# Patient Record
Sex: MALE | Race: WHITE | NOT HISPANIC OR LATINO | Employment: OTHER | URBAN - METROPOLITAN AREA
[De-identification: names, ages, dates, MRNs, and addresses within clinical notes are randomized per-mention and may not be internally consistent; named-entity substitution may affect disease eponyms.]

---

## 2022-05-31 ENCOUNTER — HOSPITAL ENCOUNTER (OUTPATIENT)
Facility: HOSPITAL | Age: 72
Discharge: HOME | End: 2022-05-31
Attending: INTERNAL MEDICINE | Admitting: INTERNAL MEDICINE
Payer: MEDICARE

## 2022-05-31 ENCOUNTER — ANESTHESIA (OUTPATIENT)
Dept: ENDOSCOPY | Facility: HOSPITAL | Age: 72
End: 2022-05-31
Payer: MEDICARE

## 2022-05-31 ENCOUNTER — ANESTHESIA EVENT (OUTPATIENT)
Dept: ENDOSCOPY | Facility: HOSPITAL | Age: 72
End: 2022-05-31
Payer: MEDICARE

## 2022-05-31 VITALS
WEIGHT: 210 LBS | BODY MASS INDEX: 31.83 KG/M2 | OXYGEN SATURATION: 95 % | SYSTOLIC BLOOD PRESSURE: 140 MMHG | DIASTOLIC BLOOD PRESSURE: 69 MMHG | TEMPERATURE: 98 F | HEIGHT: 68 IN | HEART RATE: 66 BPM | RESPIRATION RATE: 21 BRPM

## 2022-05-31 PROCEDURE — 45378 DIAGNOSTIC COLONOSCOPY: CPT | Performed by: INTERNAL MEDICINE

## 2022-05-31 PROCEDURE — 63600175 PHARM REV CODE 636 W HCPCS: Performed by: NURSE ANESTHETIST, CERTIFIED REGISTERED

## 2022-05-31 PROCEDURE — 37000008 HC ANESTHESIA 1ST 15 MINUTES: Performed by: INTERNAL MEDICINE

## 2022-05-31 PROCEDURE — 25000003 PHARM REV CODE 250: Performed by: NURSE ANESTHETIST, CERTIFIED REGISTERED

## 2022-05-31 PROCEDURE — 25000003 PHARM REV CODE 250: Performed by: INTERNAL MEDICINE

## 2022-05-31 PROCEDURE — 37000009 HC ANESTHESIA EA ADD 15 MINS: Performed by: INTERNAL MEDICINE

## 2022-05-31 RX ORDER — SODIUM CHLORIDE, SODIUM GLUCONATE, SODIUM ACETATE, POTASSIUM CHLORIDE AND MAGNESIUM CHLORIDE 30; 37; 368; 526; 502 MG/100ML; MG/100ML; MG/100ML; MG/100ML; MG/100ML
1000 INJECTION, SOLUTION INTRAVENOUS CONTINUOUS
Status: DISCONTINUED | OUTPATIENT
Start: 2022-05-31 | End: 2022-05-31 | Stop reason: HOSPADM

## 2022-05-31 RX ORDER — PROPOFOL 10 MG/ML
VIAL (ML) INTRAVENOUS
Status: DISCONTINUED
Start: 2022-05-31 | End: 2022-05-31 | Stop reason: HOSPADM

## 2022-05-31 RX ORDER — LIDOCAINE HYDROCHLORIDE 10 MG/ML
1 INJECTION, SOLUTION EPIDURAL; INFILTRATION; INTRACAUDAL; PERINEURAL ONCE
Status: CANCELLED | OUTPATIENT
Start: 2022-05-31 | End: 2022-05-31

## 2022-05-31 RX ORDER — LIDOCAINE HCL/PF 100 MG/5ML
SYRINGE (ML) INTRAVENOUS
Status: DISCONTINUED | OUTPATIENT
Start: 2022-05-31 | End: 2022-05-31

## 2022-05-31 RX ORDER — SODIUM CHLORIDE, SODIUM GLUCONATE, SODIUM ACETATE, POTASSIUM CHLORIDE AND MAGNESIUM CHLORIDE 30; 37; 368; 526; 502 MG/100ML; MG/100ML; MG/100ML; MG/100ML; MG/100ML
1000 INJECTION, SOLUTION INTRAVENOUS CONTINUOUS
Status: CANCELLED | OUTPATIENT
Start: 2022-05-31 | End: 2022-06-30

## 2022-05-31 RX ORDER — PROPOFOL 10 MG/ML
INJECTION, EMULSION INTRAVENOUS CONTINUOUS PRN
Status: DISCONTINUED | OUTPATIENT
Start: 2022-05-31 | End: 2022-05-31

## 2022-05-31 RX ADMIN — PROPOFOL 175 MCG/KG/MIN: 10 INJECTION, EMULSION INTRAVENOUS at 07:05

## 2022-05-31 RX ADMIN — SODIUM CHLORIDE, SODIUM GLUCONATE, SODIUM ACETATE, POTASSIUM CHLORIDE AND MAGNESIUM CHLORIDE 1000 ML: 526; 502; 368; 37; 30 INJECTION, SOLUTION INTRAVENOUS at 07:05

## 2022-05-31 RX ADMIN — LIDOCAINE HYDROCHLORIDE 40 MG: 20 INJECTION, SOLUTION INTRAVENOUS at 07:05

## 2022-05-31 NOTE — PROVATION PATIENT INSTRUCTIONS
Discharge Summary/Instructions after an Endoscopic Procedure  Patient Name: Jose Keita  Patient MRN: [id number]  Patient YOB: 1950  Tuesday, May 31, 2022  Nessa German III, MD  Dear patient,  As a result of recent federal legislation (The Federal Cures Act), you may   receive lab or pathology results from your procedure in your [facility]   account before your physician is able to contact you. Your physician or   their representative will relay the results to you with their   recommendations at their soonest availability.  Thank you,  RESTRICTIONS:  During your procedure today, you received medications for sedation.  These   medications may affect your judgment, balance and coordination.  Therefore,   for 24 hours, you have the following restrictions:   - DO NOT drive a car, operate machinery, make legal/financial decisions,   sign important papers or drink alcohol.    ACTIVITY:  Today: no heavy lifting, straining or running due to procedural   sedation/anesthesia.  The following day: return to full activity including work.  DIET:  Eat and drink normally unless instructed otherwise.     TREATMENT FOR COMMON SIDE EFFECTS:  - Mild abdominal pain, nausea, belching, bloating or excessive gas:  rest,   eat lightly and use a heating pad.  - Sore Throat: treat with throat lozenges and/or gargle with warm salt   water.  - Because air was used during the procedure, expelling large amounts of air   from your rectum or belching is normal.  - If a bowel prep was taken, you may not have a bowel movement for 1-3 days.    This is normal.  SYMPTOMS TO WATCH FOR AND REPORT TO YOUR PHYSICIAN:  1. Abdominal pain or bloating, other than gas cramps.  2. Chest pain.  3. Back pain.  4. Signs of infection such as: chills or fever occurring within 24 hours   after the procedure.  5. Rectal bleeding, which would show as bright red, maroon, or black stools.   (A tablespoon of blood from the rectum is not serious, especially  if   hemorrhoids are present.)  6. Vomiting.  7. Weakness or dizziness.  GO DIRECTLY TO THE NEAREST EMERGENCY ROOM IF YOU HAVE ANY OF THE FOLLOWING:      Difficulty breathing              Chills and/or fever over 101 F   Persistent vomiting and/or vomiting blood   Severe abdominal pain   Severe chest pain   Black, tarry stools   Bleeding- more than one tablespoon   Any other symptom or condition that you feel may need urgent attention  Your doctor recommends these additional instructions:  If any biopsies were taken, your doctors clinic will contact you in 1 to 2   weeks with any results.  - Repeat colonoscopy in 5 years for screening purposes.  For questions, problems or results please call your physician - Nessa German III, MD at Work:  [phone].  [facility], EMERGENCY ROOM PHONE NUMBER: [phone]  IF A COMPLICATION OR EMERGENCY SITUATION ARISES AND YOU ARE UNABLE TO REACH   YOUR PHYSICIAN - GO DIRECTLY TO THE EMERGENCY ROOM.  Nessa German III, MD  5/31/2022 8:02:06 AM  This report has been verified and signed electronically.  Dear patient,  As a result of recent federal legislation (The Federal Cures Act), you may   receive lab or pathology results from your procedure in your [facility]   account before your physician is able to contact you. Your physician or   their representative will relay the results to you with their   recommendations at their soonest availability.  Thank you,  PROVATION

## 2022-05-31 NOTE — ANESTHESIA PREPROCEDURE EVALUATION
05/31/2022  Jose Keita is a 72 y.o., male.      Pre-op Assessment    I have reviewed the Patient Summary Reports.     I have reviewed the Nursing Notes. I have reviewed the NPO Status.   I have reviewed the Medications.     Review of Systems      Physical Exam    Airway:  Mallampati: II   Mouth Opening: Normal  TM Distance: Normal  Tongue: Normal  Neck ROM: Normal ROM    Chest/Lungs:  Clear to auscultation    Heart:  Rate: Normal        Anesthesia Plan  Type of Anesthesia, risks & benefits discussed:    Anesthesia Type: Gen Natural Airway  Intra-op Monitoring Plan: Standard ASA Monitors  Induction:  IV  Informed Consent: Informed consent signed with the Patient and all parties understand the risks and agree with anesthesia plan.  All questions answered.   ASA Score: 2  Day of Surgery Review of History & Physical: H&P Update referred to the surgeon/provider.    Ready For Surgery From Anesthesia Perspective.     .

## 2022-05-31 NOTE — ANESTHESIA POSTPROCEDURE EVALUATION
Anesthesia Post Evaluation    Patient: Jose Keita    Procedure(s) Performed: Procedure(s) (LRB):  COLON (N/A)          Patient location during evaluation: PACU  Post-procedure mental status: @ basline.  Post-procedure vital signs: reviewed and stable  Pain management: adequate      Anesthetic complications: no      Cardiovascular status: blood pressure returned to baseline  Respiratory status: @ baseline.  Hydration status: euvolemic            Vitals Value Taken Time   /69 05/31/22 0824   Temp 98 05/31/22 0934   Pulse 66 05/31/22 0824   Resp 21 05/31/22 0824   SpO2 95 % 05/31/22 0824         No case tracking events are documented in the log.      Pain/Will Score: Will Score: 10 (5/31/2022  8:25 AM)

## 2022-05-31 NOTE — DISCHARGE SUMMARY
[facility] Endoscopy  Discharge Note  Short Stay    Procedure(s) (LRB):  COLON (N/A)    OUTCOME: Patient tolerated treatment/procedure well without complication and is now ready for discharge.    DISPOSITION: Home or Self Care    FINAL DIAGNOSIS:  <principal problem not specified>    FOLLOWUP: With primary care provider    DISCHARGE INSTRUCTIONS:  No discharge procedures on file.     TIME SPENT ON DISCHARGE: 15 minutes

## 2022-05-31 NOTE — H&P
.jn  Endoscopy History and Physical    PCP - Andrea Peterson MD    Procedure - Colonoscopy  Sedation: MAC  ASA - per anesthesia  Mallampati - per anesthesia  History of Anesthesia problems - no  Family history Anesthesia problems -  no     HPI:  This is a 72 y.o. male here for evaluation of colonoscopy for family hx of colon ca     Reflux - no  Dysphagia - no  Abdominal pain - no  Diarrhea - no    ROS:  Constitutional: No fevers, chills, No weight loss  ENT: No allergies  CV: No chest pain  Pulm: No cough, No shortness of breath  Ophtho: No vision changes  GI: see HPI  Medical History:  has a past medical history of Cardiomyopathy, Chronic anticoagulation, Coronary artery disease, Decreased cardiac ejection fraction, Hypertension, Mixed hyperlipidemia, Paroxysmal atrial fibrillation, and Sleep apnea.    Surgical History:  has a past surgical history that includes Tonsillectomy; Splenectomy; and Carotid endarterectomy.    Family History: family history includes Colon cancer in his brother.. Otherwise no colon cancer, inflammatory bowel disease, or GI malignancies.    Social History:  reports that he has never smoked. He has never used smokeless tobacco.    Review of patient's allergies indicates:  No Known Allergies    Medications:   No medications prior to admission.       Objective Findings:    Vital Signs: Per nursing notes.    Physical Exam:  General Appearance: Well appearing in no acute distress  Head:   Normocephalic, without obvious abnormality  Eyes:    No scleral icterus  Airway: Open  Neck: No restriction in mobility  Lungs: CTA bilaterally in anterior and posterior fields, no wheezes, no crackles.  Heart:  Regular rate and rhythm, S1, S2 normal, no murmurs heard  Abdomen: Soft, non tender, non distended      Labs:  Lab Results   Component Value Date    WBC 7.4 05/24/2021    HGB 11.4 (L) 05/24/2021    HCT 40.2 (L) 05/24/2021     05/24/2021    ALT 48 05/24/2021    AST 51 (H) 05/24/2021      05/25/2021    K 4.4 05/25/2021    CREATININE 1.35 (H) 05/25/2021    BUN 26.7 (H) 05/25/2021    CO2 29 05/25/2021    INR 2.4 (H) 05/23/2021         I have explained the risks and benefits of endoscopy procedures to the patient including but not limited to bleeding, perforation, infection, and death.    Thank you so much for allowing me to participate in the care of Jose German Iii, MD

## 2023-07-06 ENCOUNTER — HOSPITAL ENCOUNTER (INPATIENT)
Facility: HOSPITAL | Age: 73
LOS: 1 days | Discharge: HOME | DRG: 313 | End: 2023-07-08
Attending: STUDENT IN AN ORGANIZED HEALTH CARE EDUCATION/TRAINING PROGRAM | Admitting: INTERNAL MEDICINE
Payer: MEDICARE

## 2023-07-06 DIAGNOSIS — R07.9 CHEST PAIN: ICD-10-CM

## 2023-07-06 DIAGNOSIS — I50.9 CHF (CONGESTIVE HEART FAILURE): ICD-10-CM

## 2023-07-06 DIAGNOSIS — R51.9 ACUTE INTRACTABLE HEADACHE, UNSPECIFIED HEADACHE TYPE: Primary | ICD-10-CM

## 2023-07-06 LAB
BASOPHILS # BLD AUTO: 0.07 X10(3)/MCL
BASOPHILS NFR BLD AUTO: 1 %
EOSINOPHIL # BLD AUTO: 0.14 X10(3)/MCL (ref 0–0.9)
EOSINOPHIL NFR BLD AUTO: 2 %
ERYTHROCYTE [DISTWIDTH] IN BLOOD BY AUTOMATED COUNT: 15.2 % (ref 11.5–17)
HCT VFR BLD AUTO: 44.4 % (ref 42–52)
HGB BLD-MCNC: 14.7 G/DL (ref 14–18)
IMM GRANULOCYTES # BLD AUTO: 0.03 X10(3)/MCL (ref 0–0.04)
IMM GRANULOCYTES NFR BLD AUTO: 0.4 %
LYMPHOCYTES # BLD AUTO: 1.37 X10(3)/MCL (ref 0.6–4.6)
LYMPHOCYTES NFR BLD AUTO: 19.7 %
MCH RBC QN AUTO: 31.3 PG (ref 27–31)
MCHC RBC AUTO-ENTMCNC: 33.1 G/DL (ref 33–36)
MCV RBC AUTO: 94.7 FL (ref 80–94)
MONOCYTES # BLD AUTO: 1.3 X10(3)/MCL (ref 0.1–1.3)
MONOCYTES NFR BLD AUTO: 18.7 %
NEUTROPHILS # BLD AUTO: 4.05 X10(3)/MCL (ref 2.1–9.2)
NEUTROPHILS NFR BLD AUTO: 58.2 %
NRBC BLD AUTO-RTO: 0 %
PLATELET # BLD AUTO: 257 X10(3)/MCL (ref 130–400)
PMV BLD AUTO: 10.4 FL (ref 7.4–10.4)
RBC # BLD AUTO: 4.69 X10(6)/MCL (ref 4.7–6.1)
WBC # SPEC AUTO: 6.96 X10(3)/MCL (ref 4.5–11.5)

## 2023-07-06 PROCEDURE — 93010 ELECTROCARDIOGRAM REPORT: CPT | Mod: ,,, | Performed by: INTERNAL MEDICINE

## 2023-07-06 PROCEDURE — 83880 ASSAY OF NATRIURETIC PEPTIDE: CPT | Performed by: STUDENT IN AN ORGANIZED HEALTH CARE EDUCATION/TRAINING PROGRAM

## 2023-07-06 PROCEDURE — 93010 EKG 12-LEAD: ICD-10-PCS | Mod: ,,, | Performed by: INTERNAL MEDICINE

## 2023-07-06 PROCEDURE — 80053 COMPREHEN METABOLIC PANEL: CPT | Performed by: STUDENT IN AN ORGANIZED HEALTH CARE EDUCATION/TRAINING PROGRAM

## 2023-07-06 PROCEDURE — 93005 ELECTROCARDIOGRAM TRACING: CPT

## 2023-07-06 PROCEDURE — 84484 ASSAY OF TROPONIN QUANT: CPT | Performed by: STUDENT IN AN ORGANIZED HEALTH CARE EDUCATION/TRAINING PROGRAM

## 2023-07-06 PROCEDURE — 83735 ASSAY OF MAGNESIUM: CPT | Performed by: STUDENT IN AN ORGANIZED HEALTH CARE EDUCATION/TRAINING PROGRAM

## 2023-07-06 PROCEDURE — 85025 COMPLETE CBC W/AUTO DIFF WBC: CPT | Performed by: STUDENT IN AN ORGANIZED HEALTH CARE EDUCATION/TRAINING PROGRAM

## 2023-07-07 LAB
ALBUMIN SERPL-MCNC: 3.9 G/DL (ref 3.4–4.8)
ALBUMIN SERPL-MCNC: 3.9 G/DL (ref 3.4–4.8)
ALBUMIN/GLOB SERPL: 1.4 RATIO (ref 1.1–2)
ALBUMIN/GLOB SERPL: 1.4 RATIO (ref 1.1–2)
ALP SERPL-CCNC: 72 UNIT/L (ref 40–150)
ALP SERPL-CCNC: 77 UNIT/L (ref 40–150)
ALT SERPL-CCNC: 22 UNIT/L (ref 0–55)
ALT SERPL-CCNC: 23 UNIT/L (ref 0–55)
APPEARANCE UR: CLEAR
AST SERPL-CCNC: 29 UNIT/L (ref 5–34)
AST SERPL-CCNC: 29 UNIT/L (ref 5–34)
AV INDEX (PROSTH): 0.43
AV MEAN GRADIENT: 5 MMHG
AV PEAK GRADIENT: 9 MMHG
AV VALVE AREA: 1.63 CM2
AV VELOCITY RATIO: 0.39
BACTERIA #/AREA URNS AUTO: NORMAL /HPF
BASOPHILS # BLD AUTO: 0.08 X10(3)/MCL
BASOPHILS NFR BLD AUTO: 1.3 %
BILIRUB UR QL STRIP.AUTO: NEGATIVE MG/DL
BILIRUBIN DIRECT+TOT PNL SERPL-MCNC: 0.5 MG/DL
BILIRUBIN DIRECT+TOT PNL SERPL-MCNC: 0.7 MG/DL
BNP BLD-MCNC: 294.5 PG/ML
BSA FOR ECHO PROCEDURE: 2.23 M2
BUN SERPL-MCNC: 12.5 MG/DL (ref 8.4–25.7)
BUN SERPL-MCNC: 14.7 MG/DL (ref 8.4–25.7)
CALCIUM SERPL-MCNC: 8.8 MG/DL (ref 8.8–10)
CALCIUM SERPL-MCNC: 8.9 MG/DL (ref 8.8–10)
CHLORIDE SERPL-SCNC: 104 MMOL/L (ref 98–107)
CHLORIDE SERPL-SCNC: 105 MMOL/L (ref 98–107)
CO2 SERPL-SCNC: 22 MMOL/L (ref 23–31)
CO2 SERPL-SCNC: 24 MMOL/L (ref 23–31)
COLOR UR: YELLOW
CREAT SERPL-MCNC: 1.02 MG/DL (ref 0.73–1.18)
CREAT SERPL-MCNC: 1.14 MG/DL (ref 0.73–1.18)
CV ECHO LV RWT: 0.45 CM
DOP CALC AO PEAK VEL: 1.54 M/S
DOP CALC AO VTI: 32.8 CM
DOP CALC LVOT AREA: 3.8 CM2
DOP CALC LVOT DIAMETER: 2.2 CM
DOP CALC LVOT PEAK VEL: 0.6 M/S
DOP CALC LVOT STROKE VOLUME: 53.57 CM3
DOP CALCLVOT PEAK VEL VTI: 14.1 CM
E WAVE DECELERATION TIME: 203 MSEC
E/A RATIO: 1.86
E/E' RATIO: 7.26 M/S
ECHO LV POSTERIOR WALL: 1.18 CM (ref 0.6–1.1)
EJECTION FRACTION: 45 %
EOSINOPHIL # BLD AUTO: 0.1 X10(3)/MCL (ref 0–0.9)
EOSINOPHIL NFR BLD AUTO: 1.7 %
ERYTHROCYTE [DISTWIDTH] IN BLOOD BY AUTOMATED COUNT: 15.3 % (ref 11.5–17)
FRACTIONAL SHORTENING: 29 % (ref 28–44)
GFR SERPLBLD CREATININE-BSD FMLA CKD-EPI: >60 MLS/MIN/1.73/M2
GFR SERPLBLD CREATININE-BSD FMLA CKD-EPI: >60 MLS/MIN/1.73/M2
GLOBULIN SER-MCNC: 2.8 GM/DL (ref 2.4–3.5)
GLOBULIN SER-MCNC: 2.8 GM/DL (ref 2.4–3.5)
GLUCOSE SERPL-MCNC: 110 MG/DL (ref 82–115)
GLUCOSE SERPL-MCNC: 98 MG/DL (ref 82–115)
GLUCOSE UR QL STRIP.AUTO: NEGATIVE MG/DL
HCT VFR BLD AUTO: 44.1 % (ref 42–52)
HGB BLD-MCNC: 14.4 G/DL (ref 14–18)
IMM GRANULOCYTES # BLD AUTO: 0.03 X10(3)/MCL (ref 0–0.04)
IMM GRANULOCYTES NFR BLD AUTO: 0.5 %
INTERVENTRICULAR SEPTUM: 1.42 CM (ref 0.6–1.1)
KETONES UR QL STRIP.AUTO: NEGATIVE MG/DL
LEFT ATRIUM SIZE: 4.4 CM
LEFT ATRIUM VOLUME INDEX MOD: 58.4 ML/M2
LEFT ATRIUM VOLUME MOD: 125 CM3
LEFT INTERNAL DIMENSION IN SYSTOLE: 3.67 CM (ref 2.1–4)
LEFT VENTRICLE DIASTOLIC VOLUME INDEX: 60.28 ML/M2
LEFT VENTRICLE DIASTOLIC VOLUME: 129 ML
LEFT VENTRICLE MASS INDEX: 130 G/M2
LEFT VENTRICLE SYSTOLIC VOLUME INDEX: 26.6 ML/M2
LEFT VENTRICLE SYSTOLIC VOLUME: 57 ML
LEFT VENTRICULAR INTERNAL DIMENSION IN DIASTOLE: 5.19 CM (ref 3.5–6)
LEFT VENTRICULAR MASS: 277.6 G
LEUKOCYTE ESTERASE UR QL STRIP.AUTO: NEGATIVE UNIT/L
LV LATERAL E/E' RATIO: 5.75 M/S
LV SEPTAL E/E' RATIO: 9.86 M/S
LVOT MG: 1 MMHG
LVOT MV: 0.39 CM/S
LYMPHOCYTES # BLD AUTO: 1.49 X10(3)/MCL (ref 0.6–4.6)
LYMPHOCYTES NFR BLD AUTO: 24.8 %
MAGNESIUM SERPL-MCNC: 1.7 MG/DL (ref 1.6–2.6)
MAGNESIUM SERPL-MCNC: 1.7 MG/DL (ref 1.6–2.6)
MCH RBC QN AUTO: 30.9 PG (ref 27–31)
MCHC RBC AUTO-ENTMCNC: 32.7 G/DL (ref 33–36)
MCV RBC AUTO: 94.6 FL (ref 80–94)
MONOCYTES # BLD AUTO: 1.18 X10(3)/MCL (ref 0.1–1.3)
MONOCYTES NFR BLD AUTO: 19.7 %
MV PEAK A VEL: 0.37 M/S
MV PEAK E VEL: 0.69 M/S
NEUTROPHILS # BLD AUTO: 3.12 X10(3)/MCL (ref 2.1–9.2)
NEUTROPHILS NFR BLD AUTO: 52 %
NITRITE UR QL STRIP.AUTO: NEGATIVE
NRBC BLD AUTO-RTO: 0 %
OHS LV EJECTION FRACTION SIMPSONS BIPLANE MOD: 4 %
PH UR STRIP.AUTO: 6 [PH]
PHOSPHATE SERPL-MCNC: 3.2 MG/DL (ref 2.3–4.7)
PLATELET # BLD AUTO: 237 X10(3)/MCL (ref 130–400)
PMV BLD AUTO: 10.8 FL (ref 7.4–10.4)
POTASSIUM SERPL-SCNC: 4.1 MMOL/L (ref 3.5–5.1)
POTASSIUM SERPL-SCNC: 4.2 MMOL/L (ref 3.5–5.1)
PROT SERPL-MCNC: 6.7 GM/DL (ref 5.8–7.6)
PROT SERPL-MCNC: 6.7 GM/DL (ref 5.8–7.6)
PROT UR QL STRIP.AUTO: NEGATIVE MG/DL
PV PEAK VELOCITY: 1.57 CM/S
RBC # BLD AUTO: 4.66 X10(6)/MCL (ref 4.7–6.1)
RBC #/AREA URNS AUTO: <5 /HPF
RBC UR QL AUTO: NEGATIVE UNIT/L
RIGHT VENTRICULAR END-DIASTOLIC DIMENSION: 3.93 CM
SODIUM SERPL-SCNC: 139 MMOL/L (ref 136–145)
SODIUM SERPL-SCNC: 140 MMOL/L (ref 136–145)
SP GR UR STRIP.AUTO: 1.01 (ref 1–1.03)
SQUAMOUS #/AREA URNS AUTO: <5 /HPF
TDI LATERAL: 0.12 M/S
TDI SEPTAL: 0.07 M/S
TDI: 0.1 M/S
TRICUSPID ANNULAR PLANE SYSTOLIC EXCURSION: 2.14 CM
TROPONIN I SERPL-MCNC: <0.01 NG/ML (ref 0–0.04)
UROBILINOGEN UR STRIP-ACNC: 0.2 MG/DL
WBC # SPEC AUTO: 6 X10(3)/MCL (ref 4.5–11.5)
WBC #/AREA URNS AUTO: <5 /HPF

## 2023-07-07 PROCEDURE — 25000003 PHARM REV CODE 250: Performed by: NURSE PRACTITIONER

## 2023-07-07 PROCEDURE — 85025 COMPLETE CBC W/AUTO DIFF WBC: CPT | Performed by: NURSE PRACTITIONER

## 2023-07-07 PROCEDURE — 25000242 PHARM REV CODE 250 ALT 637 W/ HCPCS: Performed by: STUDENT IN AN ORGANIZED HEALTH CARE EDUCATION/TRAINING PROGRAM

## 2023-07-07 PROCEDURE — 84484 ASSAY OF TROPONIN QUANT: CPT | Performed by: STUDENT IN AN ORGANIZED HEALTH CARE EDUCATION/TRAINING PROGRAM

## 2023-07-07 PROCEDURE — 25000003 PHARM REV CODE 250: Performed by: STUDENT IN AN ORGANIZED HEALTH CARE EDUCATION/TRAINING PROGRAM

## 2023-07-07 PROCEDURE — 81001 URINALYSIS AUTO W/SCOPE: CPT | Performed by: STUDENT IN AN ORGANIZED HEALTH CARE EDUCATION/TRAINING PROGRAM

## 2023-07-07 PROCEDURE — 11000001 HC ACUTE MED/SURG PRIVATE ROOM

## 2023-07-07 PROCEDURE — 99900031 HC PATIENT EDUCATION (STAT)

## 2023-07-07 PROCEDURE — 99900035 HC TECH TIME PER 15 MIN (STAT)

## 2023-07-07 PROCEDURE — 21400001 HC TELEMETRY ROOM

## 2023-07-07 PROCEDURE — 84100 ASSAY OF PHOSPHORUS: CPT | Performed by: NURSE PRACTITIONER

## 2023-07-07 PROCEDURE — 25500020 PHARM REV CODE 255: Performed by: STUDENT IN AN ORGANIZED HEALTH CARE EDUCATION/TRAINING PROGRAM

## 2023-07-07 PROCEDURE — 99285 EMERGENCY DEPT VISIT HI MDM: CPT | Mod: 25

## 2023-07-07 PROCEDURE — 63600175 PHARM REV CODE 636 W HCPCS: Performed by: NURSE PRACTITIONER

## 2023-07-07 PROCEDURE — 25000003 PHARM REV CODE 250: Performed by: PHYSICIAN ASSISTANT

## 2023-07-07 PROCEDURE — 83735 ASSAY OF MAGNESIUM: CPT | Performed by: NURSE PRACTITIONER

## 2023-07-07 PROCEDURE — 63600175 PHARM REV CODE 636 W HCPCS: Performed by: STUDENT IN AN ORGANIZED HEALTH CARE EDUCATION/TRAINING PROGRAM

## 2023-07-07 PROCEDURE — 84484 ASSAY OF TROPONIN QUANT: CPT | Performed by: NURSE PRACTITIONER

## 2023-07-07 PROCEDURE — 27000221 HC OXYGEN, UP TO 24 HOURS

## 2023-07-07 PROCEDURE — 94660 CPAP INITIATION&MGMT: CPT

## 2023-07-07 PROCEDURE — 96375 TX/PRO/DX INJ NEW DRUG ADDON: CPT

## 2023-07-07 PROCEDURE — 27000190 HC CPAP FULL FACE MASK W/VALVE

## 2023-07-07 PROCEDURE — 94761 N-INVAS EAR/PLS OXIMETRY MLT: CPT

## 2023-07-07 PROCEDURE — 96374 THER/PROPH/DIAG INJ IV PUSH: CPT

## 2023-07-07 PROCEDURE — 80053 COMPREHEN METABOLIC PANEL: CPT | Performed by: NURSE PRACTITIONER

## 2023-07-07 RX ORDER — ONDANSETRON 2 MG/ML
4 INJECTION INTRAMUSCULAR; INTRAVENOUS EVERY 4 HOURS PRN
Status: DISCONTINUED | OUTPATIENT
Start: 2023-07-07 | End: 2023-07-08 | Stop reason: HOSPADM

## 2023-07-07 RX ORDER — FLUTICASONE FUROATE AND VILANTEROL 100; 25 UG/1; UG/1
1 POWDER RESPIRATORY (INHALATION) DAILY
Status: DISCONTINUED | OUTPATIENT
Start: 2023-07-07 | End: 2023-07-08 | Stop reason: HOSPADM

## 2023-07-07 RX ORDER — METOPROLOL SUCCINATE 25 MG/1
25 TABLET, EXTENDED RELEASE ORAL DAILY
Status: DISCONTINUED | OUTPATIENT
Start: 2023-07-07 | End: 2023-07-08 | Stop reason: HOSPADM

## 2023-07-07 RX ORDER — NALOXONE HCL 0.4 MG/ML
0.02 VIAL (ML) INJECTION
Status: DISCONTINUED | OUTPATIENT
Start: 2023-07-07 | End: 2023-07-08 | Stop reason: HOSPADM

## 2023-07-07 RX ORDER — ASPIRIN 325 MG
325 TABLET, DELAYED RELEASE (ENTERIC COATED) ORAL
Status: COMPLETED | OUTPATIENT
Start: 2023-07-07 | End: 2023-07-07

## 2023-07-07 RX ORDER — ALLOPURINOL 100 MG/1
100 TABLET ORAL DAILY
Status: DISCONTINUED | OUTPATIENT
Start: 2023-07-07 | End: 2023-07-08 | Stop reason: HOSPADM

## 2023-07-07 RX ORDER — TIZANIDINE 4 MG/1
4 TABLET ORAL 3 TIMES DAILY PRN
Status: DISCONTINUED | OUTPATIENT
Start: 2023-07-07 | End: 2023-07-08 | Stop reason: HOSPADM

## 2023-07-07 RX ORDER — COLCHICINE 0.6 MG/1
0.6 TABLET, FILM COATED ORAL
Status: DISCONTINUED | OUTPATIENT
Start: 2023-07-07 | End: 2023-07-08 | Stop reason: HOSPADM

## 2023-07-07 RX ORDER — TALC
6 POWDER (GRAM) TOPICAL NIGHTLY PRN
Status: DISCONTINUED | OUTPATIENT
Start: 2023-07-07 | End: 2023-07-08 | Stop reason: HOSPADM

## 2023-07-07 RX ORDER — TIZANIDINE 4 MG/1
4 TABLET ORAL 3 TIMES DAILY PRN
COMMUNITY

## 2023-07-07 RX ORDER — ATORVASTATIN CALCIUM 10 MG/1
10 TABLET, FILM COATED ORAL DAILY
Status: DISCONTINUED | OUTPATIENT
Start: 2023-07-07 | End: 2023-07-08 | Stop reason: HOSPADM

## 2023-07-07 RX ORDER — LABETALOL HYDROCHLORIDE 5 MG/ML
10 INJECTION, SOLUTION INTRAVENOUS
Status: DISCONTINUED | OUTPATIENT
Start: 2023-07-07 | End: 2023-07-08 | Stop reason: HOSPADM

## 2023-07-07 RX ORDER — METOPROLOL TARTRATE 50 MG/1
50 TABLET ORAL DAILY
COMMUNITY

## 2023-07-07 RX ORDER — HYDRALAZINE HYDROCHLORIDE 20 MG/ML
10 INJECTION INTRAMUSCULAR; INTRAVENOUS EVERY 4 HOURS PRN
Status: DISCONTINUED | OUTPATIENT
Start: 2023-07-07 | End: 2023-07-08 | Stop reason: HOSPADM

## 2023-07-07 RX ORDER — PROCHLORPERAZINE EDISYLATE 5 MG/ML
5 INJECTION INTRAMUSCULAR; INTRAVENOUS EVERY 6 HOURS PRN
Status: DISCONTINUED | OUTPATIENT
Start: 2023-07-07 | End: 2023-07-08 | Stop reason: HOSPADM

## 2023-07-07 RX ORDER — FUROSEMIDE 40 MG/1
40 TABLET ORAL
COMMUNITY

## 2023-07-07 RX ORDER — ROSUVASTATIN CALCIUM 40 MG/1
10 TABLET, COATED ORAL NIGHTLY
Status: ON HOLD | COMMUNITY
End: 2023-07-08 | Stop reason: HOSPADM

## 2023-07-07 RX ORDER — OLMESARTAN MEDOXOMIL 20 MG/1
20 TABLET ORAL
Status: ON HOLD | COMMUNITY
Start: 2023-06-27 | End: 2023-07-08 | Stop reason: HOSPADM

## 2023-07-07 RX ORDER — SIMETHICONE 80 MG
1 TABLET,CHEWABLE ORAL 4 TIMES DAILY PRN
Status: DISCONTINUED | OUTPATIENT
Start: 2023-07-07 | End: 2023-07-08 | Stop reason: HOSPADM

## 2023-07-07 RX ORDER — ONDANSETRON 2 MG/ML
4 INJECTION INTRAMUSCULAR; INTRAVENOUS
Status: COMPLETED | OUTPATIENT
Start: 2023-07-07 | End: 2023-07-07

## 2023-07-07 RX ORDER — MAG HYDROX/ALUMINUM HYD/SIMETH 200-200-20
30 SUSPENSION, ORAL (FINAL DOSE FORM) ORAL 4 TIMES DAILY PRN
Status: DISCONTINUED | OUTPATIENT
Start: 2023-07-07 | End: 2023-07-08 | Stop reason: HOSPADM

## 2023-07-07 RX ORDER — POLYETHYLENE GLYCOL 3350 17 G/17G
17 POWDER, FOR SOLUTION ORAL 2 TIMES DAILY PRN
Status: DISCONTINUED | OUTPATIENT
Start: 2023-07-07 | End: 2023-07-08 | Stop reason: HOSPADM

## 2023-07-07 RX ORDER — ACETAMINOPHEN 325 MG/1
650 TABLET ORAL EVERY 6 HOURS PRN
Status: DISCONTINUED | OUTPATIENT
Start: 2023-07-07 | End: 2023-07-08 | Stop reason: HOSPADM

## 2023-07-07 RX ORDER — BUTALBITAL, ACETAMINOPHEN AND CAFFEINE 50; 325; 40 MG/1; MG/1; MG/1
1 TABLET ORAL
Status: COMPLETED | OUTPATIENT
Start: 2023-07-07 | End: 2023-07-07

## 2023-07-07 RX ORDER — LISINOPRIL 10 MG/1
10 TABLET ORAL DAILY
Status: DISCONTINUED | OUTPATIENT
Start: 2023-07-07 | End: 2023-07-07

## 2023-07-07 RX ORDER — MORPHINE SULFATE 4 MG/ML
4 INJECTION, SOLUTION INTRAMUSCULAR; INTRAVENOUS
Status: COMPLETED | OUTPATIENT
Start: 2023-07-07 | End: 2023-07-07

## 2023-07-07 RX ORDER — INDOMETHACIN 50 MG/1
50 CAPSULE ORAL 2 TIMES DAILY WITH MEALS
COMMUNITY

## 2023-07-07 RX ORDER — ALLOPURINOL 100 MG/1
100 TABLET ORAL DAILY
COMMUNITY

## 2023-07-07 RX ORDER — SACUBITRIL AND VALSARTAN 24; 26 MG/1; MG/1
1 TABLET, FILM COATED ORAL 2 TIMES DAILY
Status: ON HOLD | COMMUNITY
End: 2023-07-08 | Stop reason: SDUPTHER

## 2023-07-07 RX ADMIN — HYDRALAZINE HYDROCHLORIDE 10 MG: 20 INJECTION INTRAMUSCULAR; INTRAVENOUS at 04:07

## 2023-07-07 RX ADMIN — ALLOPURINOL 100 MG: 100 TABLET ORAL at 04:07

## 2023-07-07 RX ADMIN — METOPROLOL SUCCINATE 25 MG: 25 TABLET, EXTENDED RELEASE ORAL at 04:07

## 2023-07-07 RX ADMIN — MORPHINE SULFATE 4 MG: 4 INJECTION INTRAVENOUS at 01:07

## 2023-07-07 RX ADMIN — ATORVASTATIN CALCIUM 10 MG: 10 TABLET, FILM COATED ORAL at 04:07

## 2023-07-07 RX ADMIN — ONDANSETRON 4 MG: 2 INJECTION INTRAMUSCULAR; INTRAVENOUS at 01:07

## 2023-07-07 RX ADMIN — ONDANSETRON 4 MG: 2 INJECTION INTRAMUSCULAR; INTRAVENOUS at 10:07

## 2023-07-07 RX ADMIN — BUTALBITAL, ACETAMINOPHEN, AND CAFFEINE 1 TABLET: 325; 50; 40 TABLET ORAL at 05:07

## 2023-07-07 RX ADMIN — RIVAROXABAN 20 MG: 10 TABLET, FILM COATED ORAL at 04:07

## 2023-07-07 RX ADMIN — ACETAMINOPHEN 650 MG: 325 TABLET, FILM COATED ORAL at 11:07

## 2023-07-07 RX ADMIN — FLUTICASONE FUROATE AND VILANTEROL TRIFENATATE 1 PUFF: 100; 25 POWDER RESPIRATORY (INHALATION) at 04:07

## 2023-07-07 RX ADMIN — HYDRALAZINE HYDROCHLORIDE 10 MG: 20 INJECTION INTRAMUSCULAR; INTRAVENOUS at 06:07

## 2023-07-07 RX ADMIN — ASPIRIN 325 MG: 325 TABLET, COATED ORAL at 05:07

## 2023-07-07 RX ADMIN — SACUBITRIL AND VALSARTAN 1 TABLET: 24; 26 TABLET, FILM COATED ORAL at 10:07

## 2023-07-07 RX ADMIN — IOPAMIDOL 100 ML: 755 INJECTION, SOLUTION INTRAVENOUS at 03:07

## 2023-07-07 NOTE — H&P
[facility] Hospital Medicine History & Physical Examination       Patient Name: Jose Keita  MRN: [id number]  Patient Class: IP- Inpatient   Admission Date: 7/6/2023   Admitting Physician: Javier Brownlee MD   Length of Stay: 0  Attending Physician: Javier Brownlee MD   Primary Care Provider: Andrea Peterson MD  Face-to-Face encounter date: 07/07/2023  Code Status: Full Code  Chief Complaint: Headache (Patient reports headache since yesterday worse today, also report chest pain hx of stents)        Patient information was obtained from patient, patient's family, past medical records and ER records.     HISTORY OF PRESENT ILLNESS:   Jose Keita is a 73 y.o. White male with a past medical history of hypertension, hyperlipidemia, paroxysmal atrial fibrillation on Xarelto, coronary artery disease status post stents on Plavix, cardiomyopathy, COPD not on home oxygen, obstructive sleep apnea on BiPAP and oxygen. The patient presented to [facility] on 7/6/2023 with a primary complaint of chest pain, headache and neck pain.  He reports approximately a week ago he experienced a bilateral occipital headache which has been constant significantly worsening yesterday (07/06/2023).  He reports associated symptoms of neck pain which he describes as achiness and not affecting his range of motion.  He is taking ibuprofen with some relief in neck pain.  He also reports left-sided chest pain which has been constant for the last week.  He describes it as a nonradiating, achiness with associated shortness of breath.  On exam, patient rates chest pain as a 5/10 and headache 1/10.  He denies complaints of fever, cough, vision changes, dizziness, nausea, vomiting, diarrhea and abdominal pain.  His cardiologist is Dr. Bashir.  He was taken off of Plavix approximately 3 months ago by Dr. Ribeiro electrophysiologist.  Patient denies a history of headaches or migraine.  Patient reports he was started  Benicar for his blood pressure medication around the time that symptoms started. He does not check his blood pressure at home to see if its elevated.      Upon presentation to the ED, temperature 98.8 degrees Fahrenheit, heart rate 86, blood pressure 156/91, respiratory rate 18, SpO2 96 percent on room air.  Labs with .5 and troponin less than 0.01.  UA negative for infection. EKG with sinus rhythm with 1st degree AV block and occasional periventricular complexes.   Chest x-ray with no acute cardiopulmonary process.  CT of the head with no acute intracranial findings.  CT of the head and neck preliminary read revealed moderate atheromatous calcification of the cavernous clinoid and supraclinoid segment of the bilateral ICA with associated mild stenosis, moderate atheromatous calcification with mild stenosis of the origin of the bilateral internal carotid arteries.  In ED patient received full-dose aspirin, Fioricet morphine and Zofran.  He is admitted to hospital medicine services and further medical management.    PAST MEDICAL HISTORY:     Past Medical History:   Diagnosis Date    Cardiomyopathy     Chronic anticoagulation     Coronary artery disease     Decreased cardiac ejection fraction     Hypertension     Mixed hyperlipidemia     Paroxysmal atrial fibrillation     Sleep apnea    COPD not on home oxygen    PAST SURGICAL HISTORY:     Past Surgical History:   Procedure Laterality Date    CAROTID ENDARTERECTOMY      COLONOSCOPY N/A 5/31/2022    Procedure: COLON;  Surgeon: Nessa German III, MD;  Location: [facility] ENDOSCOPY;  Service: Gastroenterology;  Laterality: N/A;    SPLENECTOMY      TONSILLECTOMY         ALLERGIES:   Patient has no known allergies.    FAMILY HISTORY:   Mother: CHF  Father:  Alzheimer's disease and strokes    SOCIAL HISTORY:   Denies tobacco use.  Drinks alcohol.  Denies illicit drug use    HOME MEDICATIONS:     Prior to Admission medications    Medication Sig Start Date End Date  Taking? Authorizing Provider   allopurinoL (ZYLOPRIM) 100 MG tablet Take 100 mg by mouth once daily.   Yes Historical Provider   colchicine (COLCRYS) 0.6 mg tablet Take 0.6 mg by mouth as needed (gout flare up).   Yes Historical Provider   furosemide (LASIX) 40 MG tablet Take 40 mg by mouth as needed (swelling).   Yes Historical Provider   indomethacin (INDOCIN) 50 MG capsule Take 50 mg by mouth 2 (two) times daily with meals.   Yes Historical Provider   metoprolol tartrate (LOPRESSOR) 50 MG tablet Take 50 mg by mouth once daily.   Yes Historical Provider   olmesartan (BENICAR) 20 MG tablet Take 20 mg by mouth. 6/27/23 12/24/23 Yes Historical Provider   rivaroxaban (XARELTO) 20 mg Tab Take 20 mg by mouth Daily.   Yes Historical Provider   rosuvastatin (CRESTOR) 40 MG Tab Take 10 mg by mouth every evening.   Yes Historical Provider   sacubitriL-valsartan (ENTRESTO) 24-26 mg per tablet Take 1 tablet by mouth 2 (two) times daily.   Yes Historical Provider   tiZANidine (ZANAFLEX) 4 MG tablet Take 4 mg by mouth 3 (three) times daily as needed (spasms).   Yes Historical Provider   atorvastatin (LIPITOR) 10 MG tablet Take 10 mg by mouth once daily.    Historical Provider   budesonide-formoterol 160-4.5 mcg (SYMBICORT) 160-4.5 mcg/actuation HFAA Inhale 2 puffs into the lungs 2 (two) times a day. Controller    Historical Provider   clopidogreL (PLAVIX) 75 mg tablet Take 75 mg by mouth once daily.    Historical Provider   lisinopriL 10 MG tablet Take 10 mg by mouth once daily.    Historical Provider   magnesium citrate solution Take 296 mLs by mouth once.    Historical Provider   metoprolol succinate (TOPROL-XL) 25 MG 24 hr tablet Take 25 mg by mouth once daily.    Historical Provider       REVIEW OF SYSTEMS:   Except as documented, all other systems reviewed and negative     PHYSICAL EXAM:     VITAL SIGNS: 24 HRS MIN & MAX LAST   Temp  Min: 97.8 °F (36.6 °C)  Max: 98.8 °F (37.1 °C) 97.8 °F (36.6 °C)   BP  Min: 126/106  Max:  201/99 129/71   Pulse  Min: 61  Max: 86  66   Resp  Min: 10  Max: 24 12   SpO2  Min: 92 %  Max: 100 % 97 %       General appearance: Well-developed, well-nourished male in no apparent distress. Son at bedside. Wet towel on forehead.   HEENT: Atraumatic head. Moist mucous membranes of oral cavity.  Lungs: Clear to auscultation bilaterally.   Heart: Regular rate and rhythm.  No edema bilateral lower extremities.  Abdomen: Soft, non-distended, non-tender. Bowel sounds are normal.   Extremities: No cyanosis, clubbing. No deformities.  Skin: No Rash. Warm and dry.  Neuro: Awake, alert and oriented. Motor and sensory exams grossly intact.  Psych/mental status: Appropriate mood and affect. Cooperative. Responds appropriately to questions.       LABS AND IMAGING:     Recent Labs   Lab 07/06/23 2315 07/07/23 0454   WBC 6.96 6.00   RBC 4.69* 4.66*   HGB 14.7 14.4   HCT 44.4 44.1   MCV 94.7* 94.6*   MCH 31.3* 30.9   MCHC 33.1 32.7*   RDW 15.2 15.3    237   MPV 10.4 10.8*       Recent Labs   Lab 07/06/23 2315 07/07/23  0454    139   K 4.1 4.2   CO2 24 22*   BUN 14.7 12.5   CREATININE 1.14 1.02   CALCIUM 8.9 8.8   MG 1.70 1.70   ALBUMIN 3.9 3.9   ALKPHOS 77 72   ALT 23 22   AST 29 29   BILITOT 0.5 0.7       Microbiology Results (last 7 days)       ** No results found for the last 168 hours. **             X-Ray Chest AP Portable  Narrative: EXAMINATION:  XR CHEST AP PORTABLE    CLINICAL HISTORY:  Chest pain, unspecified    TECHNIQUE:  Single view of the chest    COMPARISON:  05/23/2021    FINDINGS:  No focal opacification, pleural effusion, or pneumothorax.    The cardiomediastinal silhouette is within normal limits.    No acute osseous abnormality.  Impression: No acute cardiopulmonary process.    Electronically signed by: Madhu Irene  Date:    07/07/2023  Time:    07:21  CT Head Without Contrast  Narrative: EXAMINATION:  CT HEAD WITHOUT CONTRAST    CLINICAL HISTORY:  Mental status change, unknown  cause;    TECHNIQUE:  CT imaging of the head performed from the skull base to the vertex without intravenous contrast. DLP 1313 mGycm. Automatic exposure control, adjustment of mA/kV or iterative reconstruction technique was used to reduce radiation.    COMPARISON:  None Available.    FINDINGS:  There is no acute cortical infarct, hemorrhage or mass lesion.  There is moderate patchy hypoattenuation in the cerebral white matter which is nonspecific but most commonly associated with chronic small vessel ischemic changes.  The ventricles are not enlarged.  There are vascular calcifications.    There is paranasal sinus inflammation primarily ethmoid air cells.  The mastoid air cells are clear.  Impression: No acute intracranial findings.    No significant discrepancy with the preliminary report.    Electronically signed by: Joe Silva  Date:    07/07/2023  Time:    06:51  CTA Head and Neck (xpd)  START OF REPORT:  TECHNIQUE: CT ANGIOGRAM OF THE INTRACRANIAL VESSELS WAS PERFORMED WITH INTRAVENOUS CONTRAST WITH DIRECT AXIAL AS WELL AS SAGITTAL AND CORONAL REFORMATIONS. CT ANGIOGRAM OF THE NECK VESSELS WAS PERFORMED WITH INTRAVENOUS CONTRAST WITH DIRECT AXIAL AS WELL AS SAGITTAL AND CORONAL REFORMATIONS.    COMPARISON: NONE.    CLINICAL HISTORY: DIZZINESS, PERSISTENT/RECURRENT, CARDIAC OR VASCULAR CAUSE SUSPECTED.    Findings:  Intracranial Vascular structures:  Internal carotid arteries: Moderate atheromatous calcification of the cavernous clinoid and supraclinoid segment of the bilateral internal carotid arteries is seen with associated mild stenosis.  Middle cerebral arteries: Unremarkable.  Anterior cerebral arteries: Unremarkable.  Vertebral arteries: Unremarkable.  Basilar artery: Unremarkable.  Posterior cerebral arteries: Unremarkable.  Posterior communicating arteries: Unremarkable.  Carotids:  Common carotid arteries: Unremarkable.  Internal carotid artery: Moderate atheromatous calcification with mild stenosis  at the origin of the bilateral internal carotid artery is seen.  Vertebral arteries: Right vertebral artery is dominant. The vertebrobasilar junction is unremarkable.  Jugular Veins and venous sinuses: Unremarkable.  Hemorrhage: No acute intracranial hemorrhage is seen.  CSF spaces: The ventricles sulci and basal cisterns are within normal limits for age.  Brain parenchyma: There is preservation of the grey white junction throughout.  Cerebellum: Unremarkable.  Intracranial calcifications: Incidental note is made of bilateral choroid plexus calcification. Incidental note is made of some pineal region calcification.    Impression:  1. Moderate atheromatous calcification of the cavernous clinoid and supraclinoid segment of the bilateral internal carotid arteries is seen with associated mild stenosis.  2. Moderate atheromatous calcification with mild stenosis at the origin of the bilateral internal carotid artery is seen.  3. Details and other findings as described above.        ASSESSMENT & PLAN:   Assessment:  Acute chest pain, ACS rule out   Moderate atheromatous calcification of the cavernous clinoid and supraclinoid segment of the bilateral ICA   Headache - ? medication induced, secondary to elevated blood pressures, occipital neuralgia   History of hypertension, hyperlipidemia, paroxysmal atrial fibrillation on Xarelto, coronary artery disease status post stents on Plavix, combined diastolic and systolic heart failure, COPD not on home oxygen, obstructive sleep apnea on BiPAP and oxygen     Plan:  - Trend troponin x1 into less than 0.01   - Telemetry monitoring  - Echo ordered   - IV Hydralazine as needed for hypertension  - Resume appropriate home medications when deemed necessary   - Labs in AM      VTE Prophylaxis: will be placed on Xarelto for DVT prophylaxis and will be advised to be as mobile as possible and sit in a chair as  tolerated      __________________________________________________________________________  INPATIENT LIST OF MEDICATIONS     Current Facility-Administered Medications:     acetaminophen tablet 650 mg, 650 mg, Oral, Q6H PRN, Torri Espinoza AGACNP-BC    aluminum-magnesium hydroxide-simethicone 200-200-20 mg/5 mL suspension 30 mL, 30 mL, Oral, QID PRN, Torri Espinoza AGACNP-BC    hydrALAZINE injection 10 mg, 10 mg, Intravenous, Q4H PRN, Torri Espinoza AGACNP-BC, 10 mg at 07/07/23 0642    labetaloL injection 10 mg, 10 mg, Intravenous, Q2H PRN, Torri Espinoza AGACNP-BC    melatonin tablet 6 mg, 6 mg, Oral, Nightly PRN, JANE BayCNP-BC    naloxone 0.4 mg/mL injection 0.02 mg, 0.02 mg, Intravenous, PRN, Torri Espinoza AGACNP-BC    ondansetron injection 4 mg, 4 mg, Intravenous, Q4H PRN, Torri Espinoza AGACNP-BC    polyethylene glycol packet 17 g, 17 g, Oral, BID PRN, Torri Espinoza AGACNP-BC    prochlorperazine injection Soln 5 mg, 5 mg, Intravenous, Q6H PRN, Torri Espinoza AGACNP-BC    simethicone chewable tablet 80 mg, 1 tablet, Oral, QID PRN, Torri Espinoza AGACNP-BC    Current Outpatient Medications:     allopurinoL (ZYLOPRIM) 100 MG tablet, Take 100 mg by mouth once daily., Disp: , Rfl:     colchicine (COLCRYS) 0.6 mg tablet, Take 0.6 mg by mouth as needed (gout flare up)., Disp: , Rfl:     furosemide (LASIX) 40 MG tablet, Take 40 mg by mouth as needed (swelling)., Disp: , Rfl:     indomethacin (INDOCIN) 50 MG capsule, Take 50 mg by mouth 2 (two) times daily with meals., Disp: , Rfl:     metoprolol tartrate (LOPRESSOR) 50 MG tablet, Take 50 mg by mouth once daily., Disp: , Rfl:     olmesartan (BENICAR) 20 MG tablet, Take 20 mg by mouth., Disp: , Rfl:     rivaroxaban (XARELTO) 20 mg Tab, Take 20 mg by mouth Daily., Disp: , Rfl:     rosuvastatin (CRESTOR) 40 MG Tab, Take 10 mg by mouth every evening., Disp: , Rfl:     sacubitriL-valsartan (ENTRESTO) 24-26 mg per tablet, Take 1 tablet by mouth  2 (two) times daily., Disp: , Rfl:     tiZANidine (ZANAFLEX) 4 MG tablet, Take 4 mg by mouth 3 (three) times daily as needed (spasms)., Disp: , Rfl:     atorvastatin (LIPITOR) 10 MG tablet, Take 10 mg by mouth once daily., Disp: , Rfl:     budesonide-formoterol 160-4.5 mcg (SYMBICORT) 160-4.5 mcg/actuation HFAA, Inhale 2 puffs into the lungs 2 (two) times a day. Controller, Disp: , Rfl:     clopidogreL (PLAVIX) 75 mg tablet, Take 75 mg by mouth once daily., Disp: , Rfl:     lisinopriL 10 MG tablet, Take 10 mg by mouth once daily., Disp: , Rfl:     magnesium citrate solution, Take 296 mLs by mouth once., Disp: , Rfl:     metoprolol succinate (TOPROL-XL) 25 MG 24 hr tablet, Take 25 mg by mouth once daily., Disp: , Rfl:       Scheduled Meds:  Continuous Infusions:  PRN Meds:.acetaminophen, aluminum-magnesium hydroxide-simethicone, hydrALAZINE, labetaloL, melatonin, naloxone, ondansetron, polyethylene glycol, prochlorperazine, simethicone      Discharge Planning and Disposition: Anticipated discharge to be determined.    I, IRIS Lam, have reviewed and discussed the case with Dr. Sami Dickinson.    Please see the following addendum for further assessment and plan from there attending MD.    Renu Brownlee PA-C  07/07/2023

## 2023-07-07 NOTE — NURSING
Admission documentation completed by admit nurse. Med rec completed, 4 eyes assessment and documentation to be completed by admitting floor nurse.

## 2023-07-07 NOTE — PLAN OF CARE
Pt lives at [address] [place] [address] alone. No steps or stairs. Pt is  with 2 sons/ POA's Kris Keita [phone] and Gregorio Keita [phone].  PCP Andrea Peterson. No agency involvement. Pt has Bipap. Fills meds with [facility] in [place]. Sons will transport home at [place].    07/07/23 1036   Discharge Assessment   Assessment Type Discharge Planning Assessment   Confirmed/corrected address, phone number and insurance Yes   Confirmed Demographics Correct on Facesheet   Source of Information patient   When was your last doctors appointment?   (PCP Andrea Peterson)   Communicated RAYSA with patient/caregiver Date not available/Unable to determine   People in Home alone   Do you expect to return to your current living situation? Yes   Do you have help at home or someone to help you manage your care at home? Yes   Who are your caregiver(s) and their phone number(s)? sons involved and supportive Kris [phone] and Gregorio [phone]   Prior to hospitilization cognitive status: Unable to Assess   Current cognitive status: Alert/Oriented   Walking or Climbing Stairs   (none)   Dressing/Bathing   (none)   Home Accessibility wheelchair accessible   Home Layout Able to live on 1st floor   Equipment Currently Used at Home BIPAP   Readmission within 30 days? No   Patient currently being followed by outpatient case management? No   Do you currently have service(s) that help you manage your care at home? No   Do you take prescription medications? Yes  ([facility] in [place])   Do you have prescription coverage? Yes   Coverage Medicare   Do you have any problems affording any of your prescribed medications? No   Is the patient taking medications as prescribed? yes   Who is going to help you get home at discharge? [facility]   How do you get to doctors appointments? car, drives self;family or friend will provide   Are you on dialysis? No   Do you take coumadin? No   Discharge Plan A Home with family   DME Needed Upon Discharge  none    Discharge Plan discussed with: Patient   Transition of Care Barriers None
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
## 2023-07-08 VITALS
HEIGHT: 66 IN | SYSTOLIC BLOOD PRESSURE: 134 MMHG | DIASTOLIC BLOOD PRESSURE: 84 MMHG | OXYGEN SATURATION: 97 % | BODY MASS INDEX: 37.77 KG/M2 | TEMPERATURE: 98 F | RESPIRATION RATE: 18 BRPM | WEIGHT: 235 LBS | HEART RATE: 86 BPM

## 2023-07-08 PROBLEM — R07.9 CHEST PAIN: Status: RESOLVED | Noted: 2023-07-08 | Resolved: 2023-07-08

## 2023-07-08 PROBLEM — R07.9 CHEST PAIN: Status: ACTIVE | Noted: 2023-07-08

## 2023-07-08 LAB
ANION GAP SERPL CALC-SCNC: 10 MEQ/L
BUN SERPL-MCNC: 14 MG/DL (ref 8.4–25.7)
CALCIUM SERPL-MCNC: 9.8 MG/DL (ref 8.8–10)
CHLORIDE SERPL-SCNC: 102 MMOL/L (ref 98–107)
CO2 SERPL-SCNC: 29 MMOL/L (ref 23–31)
CREAT SERPL-MCNC: 1.25 MG/DL (ref 0.73–1.18)
CREAT/UREA NIT SERPL: 11
GFR SERPLBLD CREATININE-BSD FMLA CKD-EPI: >60 MLS/MIN/1.73/M2
GLUCOSE SERPL-MCNC: 84 MG/DL (ref 82–115)
POTASSIUM SERPL-SCNC: 4.5 MMOL/L (ref 3.5–5.1)
SODIUM SERPL-SCNC: 141 MMOL/L (ref 136–145)
TROPONIN I SERPL-MCNC: <0.01 NG/ML (ref 0–0.04)

## 2023-07-08 PROCEDURE — 80048 BASIC METABOLIC PNL TOTAL CA: CPT | Performed by: STUDENT IN AN ORGANIZED HEALTH CARE EDUCATION/TRAINING PROGRAM

## 2023-07-08 PROCEDURE — 25000242 PHARM REV CODE 250 ALT 637 W/ HCPCS: Performed by: STUDENT IN AN ORGANIZED HEALTH CARE EDUCATION/TRAINING PROGRAM

## 2023-07-08 PROCEDURE — 27000221 HC OXYGEN, UP TO 24 HOURS

## 2023-07-08 PROCEDURE — 94761 N-INVAS EAR/PLS OXIMETRY MLT: CPT

## 2023-07-08 PROCEDURE — 94660 CPAP INITIATION&MGMT: CPT

## 2023-07-08 PROCEDURE — 99900035 HC TECH TIME PER 15 MIN (STAT)

## 2023-07-08 PROCEDURE — 27000190 HC CPAP FULL FACE MASK W/VALVE

## 2023-07-08 PROCEDURE — 25000003 PHARM REV CODE 250: Performed by: STUDENT IN AN ORGANIZED HEALTH CARE EDUCATION/TRAINING PROGRAM

## 2023-07-08 PROCEDURE — 99900031 HC PATIENT EDUCATION (STAT)

## 2023-07-08 RX ORDER — SACUBITRIL AND VALSARTAN 24; 26 MG/1; MG/1
2 TABLET, FILM COATED ORAL 2 TIMES DAILY
Qty: 60 TABLET | Refills: 0
Start: 2023-07-08

## 2023-07-08 RX ADMIN — ATORVASTATIN CALCIUM 10 MG: 10 TABLET, FILM COATED ORAL at 09:07

## 2023-07-08 RX ADMIN — ALLOPURINOL 100 MG: 100 TABLET ORAL at 09:07

## 2023-07-08 RX ADMIN — FLUTICASONE FUROATE AND VILANTEROL TRIFENATATE 1 PUFF: 100; 25 POWDER RESPIRATORY (INHALATION) at 09:07

## 2023-07-08 RX ADMIN — SACUBITRIL AND VALSARTAN 1 TABLET: 24; 26 TABLET, FILM COATED ORAL at 09:07

## 2023-07-08 RX ADMIN — METOPROLOL SUCCINATE 25 MG: 25 TABLET, EXTENDED RELEASE ORAL at 09:07

## 2023-07-08 NOTE — NURSING
Nurses Note -- 4 Eyes      7/8/2023   3:05 AM      Skin assessed during: Admit      [x] No Altered Skin Integrity Present    [x]Prevention Measures Documented      [] Yes- Altered Skin Integrity Present or Discovered   [] LDA Added if Not in Epic (Describe Wound)   [] New Altered Skin Integrity was Present on Admit and Documented in LDA   [] Wound Image Taken    Wound Care Consulted? No    Attending Nurse:  Malcolm Scott LPN     Second RN/Staff Member:  Viv Navarrete RN

## 2023-07-09 NOTE — DISCHARGE SUMMARY
Hospital Medicine  Discharge Summary    Patient Name: Jose Keita  MRN: [id number]  Admit Date: 7/6/2023  Discharge Date: 7/8/2023   Status: IP- Inpatient   Length of Stay: 1      PHYSICIANS   Admitting Physician: Javier Brownlee MD  Discharging Physician: Cheng Krishnan MD.  Primary Care Physician: Andrea Peterson MD  Consults: None      DISCHARGE DIAGNOSES   Atypical chest pain  HTN urgency  PAF on Xarelto      PROCEDURES   None      HOSPITAL COURSE     73 y.o. White male with a history of hypertension, hyperlipidemia, paroxysmal atrial fibrillation on Xarelto, coronary artery disease status post stents on Plavix, cardiomyopathy, COPD not on home oxygen, obstructive sleep apnea on BiPAP and oxygen; presented to [facility] on 7/6 with chest pain, headache and neck pain.  He was taking ibuprofen with some relief in neck pain.  He also reports left-sided chest pain which has been constant for the last week.  He describes it as a nonradiating, achiness with associated shortness of breath.  His cardiologist is Dr. Bashir.  He was taken off of Plavix approximately 3 months ago by Dr. Ribeiro, his electrophysiologist. Evaluation in ED noted accelerated blood pressures, otherwise vitals stable. Troponin was undetectable.  EKG with sinus rhythm with 1st degree AV block and occasional periventricular complexes. CTA H/N showed atherosclerotic changes of the carotid arteries with 40-50% stenosis at the right carotid bulb.  CXR showed clear lung fields B/L with no obvious infiltrates/consolidates/effusions.  He was admitted to hospital medicine services for further management.  Pressures were better controlled overnight, antihypertensive regime adjusted; no further chest pain.  Troponin remained negative.  Patient appropriate for discharge home to follow with primary physicians for further titration.      STATUS  Improved    DISPOSITION  Discharge to home    DIET  Cardiac    ACTIVITY  As tolerated      FOLLOW-UP      Andrea  SHAD Peterson MD. Schedule an appointment as soon as possible for a visit in 1 week(s).    Specialty: Family Medicine  Contact information:  [address] [place] [address]  [phone]                 DISCHARGE MEDICATION RECONCILIATION     CONTINUE with CHANGES      ENTRESTO 24-26 mg per tablet  Generic drug: sacubitriL-valsartan  Take 2 tablets by mouth 2 (two) times daily.  What changed: how much to take            CONTINUE      allopurinoL 100 MG tablet  Commonly known as: ZYLOPRIM     atorvastatin 10 MG tablet  Commonly known as: LIPITOR     budesonide-formoterol 160-4.5 mcg 160-4.5 mcg/actuation Hfaa  Commonly known as: SYMBICORT     colchicine 0.6 mg tablet  Commonly known as: COLCRYS     furosemide 40 MG tablet  Commonly known as: LASIX     indomethacin 50 MG capsule  Commonly known as: INDOCIN     magnesium citrate solution     metoprolol tartrate 50 MG tablet  Commonly known as: LOPRESSOR     rivaroxaban 20 mg Tab  Commonly known as: XARELTO     tiZANidine 4 MG tablet  Commonly known as: ZANAFLEX            DISCONTINUE     clopidogreL 75 mg tablet  Commonly known as: PLAVIX     lisinopriL 10 MG tablet     metoprolol succinate 25 MG 24 hr tablet  Commonly known as: TOPROL-XL     olmesartan 20 MG tablet  Commonly known as: BENICAR     rosuvastatin 40 MG Tab  Commonly known as: CRESTOR         PHYSICAL EXAM   VITALS: T 97.5 °F (36.4 °C)   /84   P 86   RR 18   O2 97 %    GENERAL: awake and in no acute distress  LUNGS: Respirations non-labored  CVS: RRR  ABD: Soft, non-tender  EXTREMITIES:No LE edema  NEURO: AAOx3  PSYCHIATRIC: Cooperative          Discharge time: 33 minutes     Cheng Krishnan MD  Hospital Medicine       DIAGNOSITCS   CBC:   Recent Labs   Lab 07/06/23 2315 07/07/23  0454   WBC 6.96 6.00   HGB 14.7 14.4   HCT 44.4 44.1    237     CMP:   Recent Labs   Lab 07/06/23 2315 07/07/23  0454 07/08/23  0924   CALCIUM 8.9 8.8 9.8   ALBUMIN 3.9 3.9  --     139 141   K 4.1  4.2 4.5   CO2 24 22* 29   BUN 14.7 12.5 14.0   CREATININE 1.14 1.02 1.25*   ALKPHOS 77 72  --    ALT 23 22  --    AST 29 29  --    BILITOT 0.5 0.7  --    MG 1.70 1.70  --    PHOS  --  3.2  --      Estimated Creatinine Clearance: 60.2 mL/min (A) (based on SCr of 1.25 mg/dL (H)).    CARDIAC ENZYMES:   Recent Labs     07/07/23  1126 07/07/23  1643 07/07/23  2306   TROPONINI <0.010 <0.010 <0.010      CT Head Without Contrast  Result Date: 7/7/2023  EXAMINATION: CT HEAD WITHOUT CONTRAST CLINICAL HISTORY: Mental status change, unknown cause; TECHNIQUE: CT imaging of the head performed from the skull base to the vertex without intravenous contrast. DLP 1313 mGycm. Automatic exposure control, adjustment of mA/kV or iterative reconstruction technique was used to reduce radiation. COMPARISON: None Available. FINDINGS: There is no acute cortical infarct, hemorrhage or mass lesion.  There is moderate patchy hypoattenuation in the cerebral white matter which is nonspecific but most commonly associated with chronic small vessel ischemic changes.  The ventricles are not enlarged.  There are vascular calcifications. There is paranasal sinus inflammation primarily ethmoid air cells.  The mastoid air cells are clear.   Impression:  No acute intracranial findings. No significant discrepancy with the preliminary report.   Electronically signed by: Joe Silva Date:    07/07/2023 Time:    06:51    X-Ray Chest AP Portable  Result Date: 7/7/2023  EXAMINATION: XR CHEST AP PORTABLE CLINICAL HISTORY: Chest pain, unspecified TECHNIQUE: Single view of the chest COMPARISON: 05/23/2021 FINDINGS: No focal opacification, pleural effusion, or pneumothorax. The cardiomediastinal silhouette is within normal limits. No acute osseous abnormality.   Impression:  No acute cardiopulmonary process.   Electronically signed by: Madhu Irene Date:    07/07/2023 Time:    07:21    Echo  Result Date: 7/7/2023  · Mild concentric hypertrophy and mildly  decreased systolic function. · Mild tricuspid regurgitation. · Mild pulmonic regurgitation. · The estimated ejection fraction is 45%. · Grade I left ventricular diastolic dysfunction. · Mild aortic regurgitation. · Normal right ventricular size with normal right ventricular systolic function. · There is no pulmonary hypertension.      CTA Head and Neck (xpd)  Result Date: 7/7/2023  EXAMINATION: CTA HEAD AND NECK (XPD) CLINICAL HISTORY: Dizziness, persistent/recurrent, cardiac or vascular cause suspected; TECHNIQUE: Axial images obtained through the cervical region and [place] of [place] before and after the administration of intravenous contrast. Coronal, sagittal, MIP and 3D reconstructions were obtained from the axial data. Automatic exposure control was utilized to limit radiation dose. Radiation Dose: Total DLP: 1922 mGy*cm COMPARISON: CT head dated 07/07/2023 FINDINGS: Head CT with contrast: No interval changes when compared to the previous CT. No enhancing abnormalities. If present, stenosis of the carotid bulbs is measured based on NASCET criteria, i.e. area of maximal stenosis compared to the cervical ICA distal to the bulb. Cervical CTA: The origins of the great vessels are patent with mild scattered calcifications. The common carotid arteries are patent.  There are calcifications at the carotid bulbs with 40-50% stenosis on the right.  The internal carotid arteries are patent. The vertebral arteries are patent. Intracranial CTA: The internal carotid arteries are patent with mild scattered calcifications.  The middle cerebral arteries and anterior cerebral arteries are patent. The vertebral arteries, basilar artery and posterior cerebral arteries are patent. The dural venous sinuses are patent.   Impression:  1. No large vessel occlusion or flow-limiting stenosis.   2. Atherosclerotic changes of the carotid arteries with 40-50% stenosis at the right carotid bulb by NASCET criteria. No significant change from  the   Electronically signed by: Lynsey Barry Date:    07/07/2023 Time:    12:15

## 2023-07-13 ENCOUNTER — PATIENT OUTREACH (OUTPATIENT)
Dept: ADMINISTRATIVE | Facility: CLINIC | Age: 73
End: 2023-07-13
Payer: MEDICARE

## 2023-07-13 NOTE — PROGRESS NOTES
C3 nurse spoke with Jose Keita  for a TCC post hospital discharge follow up call. Requested call back. The patient does not have a scheduled HOSFU appointment with Andrea Peterson MD  within 5-7 days post hospital discharge date 07/08/2023 noted. Unable to route to PCP staff.

## 2023-07-14 NOTE — PROGRESS NOTES
C3 nurse attempted to contact Jose Keita  for a TCC post hospital discharge follow up call. No answer. Left voicemail with callback information. The patient does not have a scheduled HOSFU appointment with Andrea Peterson MD  within 5-7 days post hospital discharge date 07/08/2023 noted. Unable to route to PCP staff.

## 2023-07-20 NOTE — PHYSICIAN QUERY
PT Name: Jose Keita  MR #: [id number]     DOCUMENTATION CLARIFICATION      CDS/: Nidia Rhodes RN              Contact information: [email or web address]  This form is a permanent document in the medical record.     Query Date: July 20, 2023    By submitting this query, we are merely seeking further clarification of documentation.  Please utilize your independent clinical judgment when addressing the question(s) below.     The Medical Record contains the following:    Clinical Information Location in Medical Record     admitted with complaints of occipital headache, associated neck pain for the last 1 week with progressive worsening & chest pain located  centrally with radiation to the back but not LUE/neck/jaw.    Will trend Troponins q8hrs x 3. Will F/U Echocardiogram. Headaches & neck pain thought to be likely from uncontrolled HTN which have resolved since admission. Will continue monitoring symptoms. Will continue allopurinol 100 mg daily, atorvastatin 10 mg,  metoprolol succinate 25 mg daily, Xarelto 20 mg daily, Entresto 2426 mg b.i.d., fluticasone-vilanterol daily.  Will consult [facility] if needed for positive troponins or echocardiogram findings.    Acute chest pain, ACS rule out  Moderate atheromatous calcification of the cavernous clinoid and supraclinoid segment of the bilateral ICA  Headache - ? medication induced, secondary to elevated blood pressures, occipital neuralgia      DISCHARGE DIAGNOSES :  Atypical chest pain  HTN urgency  PAF on Xarelto      presented to [facility] on 7/6 with chest pain, headache and neck pain.  He was taking ibuprofen with some relief in neck pain.  He also reports left-sided chest pain which has been constant  for the last week.  He describes it as a nonradiating, achiness with associated shortness of breath.  His cardiologist is Dr. Bashir.  He was taken off of Plavix approximately 3 months ago by Dr. Ribeiro, his electrophysiologist. Evaluation in ED  noted  accelerated blood pressures, otherwise vitals stable. Troponin was undetectable.  EKG with sinus rhythm with 1st degree AV block and occasional periventricular complexes. CTA H/N showed atherosclerotic changes of the carotid arteries with 40-50%  stenosis at the right carotid bulb.  CXR showed clear lung fields B/L with no obvious infiltrates/consolidates/effusions.  He was admitted to hospital medicine services for further management.  Pressures were better controlled overnight, antihypertensive  regime adjusted; no further chest pain.  Troponin remained negative   H&P 7/7       HM                      H&P 7/7                 DC summary 7/8                 DC Summary 7/8           Please document your best medical opinion regarding the etiology of __ Chest Pain  _____?       [   ] Hypertensive Urgency     [   ] Other etiology (please specify):___________________     [ x ] Clinically Undetermined             Please document in your progress notes daily for the duration of treatment, until resolved, and include in your discharge summary.

## (undated) DEVICE — SOL IRRI STRL WATER 1000ML

## (undated) DEVICE — ADAPTER DUAL NSL LUER M-M 7FT

## (undated) DEVICE — BLOCK BLOX BITE DENT RIM 54FR

## (undated) DEVICE — KIT SURGICAL COLON .25 1.1OZ

## (undated) DEVICE — TIP SUCTION YANKAUER

## (undated) DEVICE — UNDERPAD DISPOSABLE 30X30IN

## (undated) DEVICE — KIT CANIST SUCTION 1200CC

## (undated) DEVICE — COLLECTION SPECIMEN NEPTUNE